# Patient Record
Sex: MALE | Race: WHITE | NOT HISPANIC OR LATINO | URBAN - METROPOLITAN AREA
[De-identification: names, ages, dates, MRNs, and addresses within clinical notes are randomized per-mention and may not be internally consistent; named-entity substitution may affect disease eponyms.]

---
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
## 2024-08-23 NOTE — ED PROVIDER NOTE - NS_EDPROVIDERDISPOUSERTYPE_ED_A_ED
"Daily Note     Today's date: 2024  Patient name: Maggie Bullock  : 1987  MRN: [id number]  Referring provider: Norbert Murray MD  Dx:   Encounter Diagnosis     ICD-10-CM    1. Pain of left calf  M79.662           Start Time: 1020  Stop Time: 1058  Total time in clinic (min): 38 minutes    Subjective: Progressing well reduced pain.     Objective: See treatment diary below    Assessment: Tolerated treatment well. Patient demonstrated fatigue post treatment, exhibited good technique with therapeutic exercises, and would benefit from continued PT 1:1 with Андрей Zarate DPT for entirety of tx.     Plan: Continue per plan of care.      Precautions: universal    POC expires Unit limit Auth Expiration date PT/OT + Visit Limit?   24         Visit/Unit Tracking  AUTH Status:  Date    Req post 24v Used 1 2 3 4 5 6 7 8 9 10    Remaining                 Pertinent Findings:      POC End Date: 24                                                                                          Test / Measure      FOTO (Predicted 72) 52    SL HR test R/L 20/5    L Hip abd 3+/5             Manuals    Cupping w/ movt  KS SS held         Piriformis S  3x30\" 3x30\"  3x30\" 3x30\"                                 Neuro Re-Ed             SL Eccentric HR   2x10 10# held    2x10 2x20 2x20 10#   HR on block  2x20 2x20 2x20 2x20 2x20 3x20 3x20 3x20    Wall learn TR          3x20   Hip abd SLR  2x15 2x15 2# 2x15 2x15 2# 2x15 2# 1x15 2#  1x15 3# B 2x15 3# D/C    Hip Add SLR  2x15 2x15 2# 2x15 2x15 2# 2x15 2#  1x15 2#  1x15 3# B 2x15 3# D/C    Hip Flexion SLR             Lat band walk  hold 2x20 btb 2x20 black 2x20 black 2x20 black 4x20 black 4x20 black Blk 20ft   x6 Blk 20ft   x6   SL Foam  2x30\" 2x30\"  2x30\" 2x30\" 2x30\" B 2x30\" B 3x30\" B 3x30\" B   Nose leans        2x20\" 2x20\" 2x20\"   SLS 4-way Oscillation         Sincere 8# x20\" ea " "(B) Sincere 8# x20\" ea (B)   Ther Ex             NS w/ HR  6' 6' 6' 6' 6' 6' 6' 6' 6'   HEP reve 8'            prostretch  4x30\" 4x30\" 4x30\" 4x30\" 4x30\" 4x30\" B 4x30\" B 4x30\" B 4x30\" B                                                                                 Ther Activity                                       Gait Training                                       Modalities                                                          " Attending Attestation (For Attendings USE Only)...
